# Patient Record
Sex: FEMALE | Race: WHITE | NOT HISPANIC OR LATINO
[De-identification: names, ages, dates, MRNs, and addresses within clinical notes are randomized per-mention and may not be internally consistent; named-entity substitution may affect disease eponyms.]

---

## 2018-07-10 PROBLEM — Z00.00 ENCOUNTER FOR PREVENTIVE HEALTH EXAMINATION: Status: ACTIVE | Noted: 2018-07-10

## 2018-08-27 ENCOUNTER — APPOINTMENT (OUTPATIENT)
Dept: ORTHOPEDIC SURGERY | Facility: CLINIC | Age: 23
End: 2018-08-27

## 2018-09-24 ENCOUNTER — APPOINTMENT (OUTPATIENT)
Dept: ORTHOPEDIC SURGERY | Facility: CLINIC | Age: 23
End: 2018-09-24
Payer: COMMERCIAL

## 2018-09-24 VITALS
BODY MASS INDEX: 23.54 KG/M2 | HEIGHT: 67 IN | DIASTOLIC BLOOD PRESSURE: 73 MMHG | HEART RATE: 76 BPM | TEMPERATURE: 98 F | SYSTOLIC BLOOD PRESSURE: 111 MMHG | WEIGHT: 150 LBS

## 2018-09-24 PROCEDURE — 99204 OFFICE O/P NEW MOD 45 MIN: CPT

## 2018-09-24 PROCEDURE — 73630 X-RAY EXAM OF FOOT: CPT | Mod: RT

## 2018-09-24 RX ORDER — NORGESTIMATE AND ETHINYL ESTRADIOL 7DAYSX3 LO
0.18/0.215/0.25 KIT ORAL
Refills: 0 | Status: ACTIVE | COMMUNITY

## 2019-02-15 ENCOUNTER — APPOINTMENT (OUTPATIENT)
Dept: ORTHOPEDIC SURGERY | Facility: CLINIC | Age: 24
End: 2019-02-15
Payer: COMMERCIAL

## 2019-02-15 PROCEDURE — 99214 OFFICE O/P EST MOD 30 MIN: CPT

## 2019-02-15 NOTE — HISTORY OF PRESENT ILLNESS
[FreeTextEntry1] : 23 year year old female presenting with right foot pain. The patient’s pain is noted to be a 5/10. The patient describes their pain and swelling as the same compared to their last visit. The patient reports that she is booked for surgery for March 13, 2019. The patient would like to go over details relating to the surgery and  PO care. She presents wearing regular shoes. No other complaints at this time.

## 2019-02-15 NOTE — DISCUSSION/SUMMARY
[de-identified] : Today I had a lengthy discussion with the patient regarding their right foot pain.I have addressed all the patient's concerns surrounding the pathology of their condition. I educated the patient about the surgical procedure. I also reviewed the details of the surgery including PO care, PO recovery time, and PO shoe-wear modifications. I answered all the patient's questions regarding the surgery and provided her with a better understanding of the surgical procedure. The patient understood and verbally agreed to the treatment plan. All of their questions were answered and they were satisfied with the visit. The patient should call the office if they have any questions or experience worsening symptoms.

## 2019-02-15 NOTE — PHYSICAL EXAM
[de-identified] : General: Alert and oriented x3. In no acute distress. Pleasant in nature with a normal affect. No apparent respiratory distress.\par \par R Foot Exam\par \par Skin: Clean, dry and intact.\par Inspection: No obvious malalignment, no swelling, no effusion. No lymphadenopathy. Abnormal cascade to toes, 2nd and 4th are shorter. The 1st, 3rd, and 5th are appropriate lengths. \par Pulses: 2+ DP/PT pulses\par ROM: Foot: Full ROM of digits. Ankle: 10 degrees dorsiflexion, 40 degrees of plantarflexion, 10 degrees of subtalar motion.\par Tenderness: plantar callus 1st and 3rd MTP joint. Recessed MTP 2nd and 4th. No tenderness over the lateral malleolus, no CFL/ATFL/PTFL pain. No medial malleolus pain, no deltoid ligament pain. No heel pain. No Achilles tenderness. No 5th metatarsal pain. No pain to the LisFranc joint. No ttp over the posterior tibial tendon. \par Painful ROM: None\par Stability: Negative anterior/posterior drawer. \par Strength: 5/5 ADD/ABD/TA/GS/EHL/FHL/EDL\par Neuro: Intact to light touch throughout\par Additional tests: Negative Reis's test, negative tarsal tunnel tinel's, negative single heel rise.

## 2019-02-15 NOTE — ADDENDUM
[FreeTextEntry1] : I, Jonathon Low, acted solely as a scribe for Dr. Arthur Bains on this date 02/15/2019  .\par  \par All medical record entries made by the Scribe were at my, Dr. Arthur Bains, direction and personally dictated by me on 02/15/2019 . I have reviewed the chart and agree that the record accurately reflects my personal performance of the history, physical exam, assessment and plan. I have also personally directed, reviewed, and agreed with the chart.\par \par \par

## 2019-03-08 RX ORDER — ALPRAZOLAM 0.25 MG/1
0.25 TABLET ORAL
Qty: 2 | Refills: 0 | Status: ACTIVE | COMMUNITY
Start: 2019-03-08 | End: 1900-01-01

## 2019-03-12 ENCOUNTER — OUTPATIENT (OUTPATIENT)
Dept: OUTPATIENT SERVICES | Facility: HOSPITAL | Age: 24
LOS: 1 days | Discharge: HOME | End: 2019-03-12
Payer: COMMERCIAL

## 2019-03-12 ENCOUNTER — APPOINTMENT (OUTPATIENT)
Dept: ORTHOPEDIC SURGERY | Facility: HOSPITAL | Age: 24
End: 2019-03-12

## 2019-03-12 VITALS
OXYGEN SATURATION: 100 % | RESPIRATION RATE: 16 BRPM | DIASTOLIC BLOOD PRESSURE: 84 MMHG | HEART RATE: 56 BPM | TEMPERATURE: 98 F | SYSTOLIC BLOOD PRESSURE: 137 MMHG

## 2019-03-12 VITALS
SYSTOLIC BLOOD PRESSURE: 133 MMHG | HEIGHT: 67 IN | HEART RATE: 53 BPM | RESPIRATION RATE: 16 BRPM | OXYGEN SATURATION: 99 % | WEIGHT: 149.91 LBS | TEMPERATURE: 98 F

## 2019-03-12 DIAGNOSIS — M79.671 PAIN IN RIGHT FOOT: ICD-10-CM

## 2019-03-12 DIAGNOSIS — M77.41 METATARSALGIA, RIGHT FOOT: ICD-10-CM

## 2019-03-12 LAB — HCG UR QL: NEGATIVE — SIGNIFICANT CHANGE UP

## 2019-03-12 PROCEDURE — 28308 INCISION OF METATARSAL: CPT | Mod: T7

## 2019-03-12 PROCEDURE — 76000 FLUOROSCOPY <1 HR PHYS/QHP: CPT | Mod: 26

## 2019-03-12 RX ORDER — NORGESTIMATE AND ETHINYL ESTRADIOL 7DAYSX3 LO
1 KIT ORAL
Qty: 0 | Refills: 0 | COMMUNITY

## 2019-03-12 RX ORDER — OXYCODONE HYDROCHLORIDE 5 MG/1
10 TABLET ORAL ONCE
Qty: 0 | Refills: 0 | Status: DISCONTINUED | OUTPATIENT
Start: 2019-03-12 | End: 2019-03-12

## 2019-03-12 RX ORDER — SODIUM CHLORIDE 9 MG/ML
1000 INJECTION, SOLUTION INTRAVENOUS
Qty: 0 | Refills: 0 | Status: DISCONTINUED | OUTPATIENT
Start: 2019-03-12 | End: 2019-03-12

## 2019-03-12 RX ORDER — ASPIRIN/CALCIUM CARB/MAGNESIUM 324 MG
1 TABLET ORAL
Qty: 30 | Refills: 0 | OUTPATIENT
Start: 2019-03-12 | End: 2019-04-10

## 2019-03-12 RX ORDER — ONDANSETRON 8 MG/1
1 TABLET, FILM COATED ORAL
Qty: 30 | Refills: 0 | OUTPATIENT
Start: 2019-03-12 | End: 2019-03-21

## 2019-03-12 RX ORDER — HYDROMORPHONE HYDROCHLORIDE 2 MG/ML
0.5 INJECTION INTRAMUSCULAR; INTRAVENOUS; SUBCUTANEOUS
Qty: 0 | Refills: 0 | Status: DISCONTINUED | OUTPATIENT
Start: 2019-03-12 | End: 2019-03-12

## 2019-03-12 RX ORDER — ONDANSETRON 8 MG/1
4 TABLET, FILM COATED ORAL ONCE
Qty: 0 | Refills: 0 | Status: DISCONTINUED | OUTPATIENT
Start: 2019-03-12 | End: 2019-03-12

## 2019-03-12 RX ORDER — DOCUSATE SODIUM 100 MG
1 CAPSULE ORAL
Qty: 45 | Refills: 0 | OUTPATIENT
Start: 2019-03-12 | End: 2019-03-26

## 2019-03-12 RX ADMIN — OXYCODONE HYDROCHLORIDE 10 MILLIGRAM(S): 5 TABLET ORAL at 12:00

## 2019-03-12 RX ADMIN — OXYCODONE HYDROCHLORIDE 10 MILLIGRAM(S): 5 TABLET ORAL at 12:15

## 2019-03-12 NOTE — ASU DISCHARGE PLAN (ADULT/PEDIATRIC) - CARE PROVIDER_API CALL
Arthur Bains (DO)  Orthopaedic Surgery  [address] [place] [address]  Phone: [phone]  Fax: [phone]  Follow Up Time:

## 2019-03-12 NOTE — ASU DISCHARGE PLAN (ADULT/PEDIATRIC) - ASU DC SPECIAL INSTRUCTIONSFT
please call office for follow up appointment; please rest and elevate affected foot; non weight bearing right lower extremity, crutches as needed; keep splint clean dry intact;

## 2019-03-12 NOTE — BRIEF OPERATIVE NOTE - NSICDXBRIEFPROCEDURE_GEN_ALL_CORE_FT
PROCEDURES:  Osteotomy, metatarsal, fifth 12-Mar-2019 11:55:21 Shortening wedge osteotomy of Right 3rd metatarsal Carrie Spangler

## 2019-03-14 DIAGNOSIS — M77.41 METATARSALGIA, RIGHT FOOT: ICD-10-CM

## 2019-03-20 ENCOUNTER — APPOINTMENT (OUTPATIENT)
Dept: ORTHOPEDIC SURGERY | Facility: CLINIC | Age: 24
End: 2019-03-20
Payer: COMMERCIAL

## 2019-03-20 PROCEDURE — 97760 ORTHOTIC MGMT&TRAING 1ST ENC: CPT | Mod: 58

## 2019-03-20 PROCEDURE — 73630 X-RAY EXAM OF FOOT: CPT | Mod: RT

## 2019-03-20 PROCEDURE — 99024 POSTOP FOLLOW-UP VISIT: CPT

## 2019-03-20 RX ORDER — ONDANSETRON 4 MG/1
4 TABLET ORAL
Qty: 30 | Refills: 0 | Status: ACTIVE | COMMUNITY
Start: 2019-03-12

## 2019-03-20 RX ORDER — OXYCODONE AND ACETAMINOPHEN 5; 325 MG/1; MG/1
5-325 TABLET ORAL
Qty: 42 | Refills: 0 | Status: ACTIVE | COMMUNITY
Start: 2019-03-12

## 2019-03-20 NOTE — HISTORY OF PRESENT ILLNESS
[___ Days Post Op] : post op day #[unfilled] [0] : no pain reported [Swelling] : swollen [Doing Well] : is doing well [No Sign of Infection] : is showing no signs of infection [Excellent Pain Control] : has excellent pain control [Chills] : no chills [Fever] : no fever [Nausea] : no nausea [Vomiting] : no vomiting [de-identified] : Third metatarsal shortening osteotomy, midshaft.\par DOS 3/12/19 [de-identified] : 23 year old female present in the office s/p Third metatarsal shortening osteotomy, midshaft.DOS 3/12/19. Pt states she has improved in pain and swelling as compared to their last visit.  Pt is accompanied by her mother. No other complaints at this time.  [de-identified] : General: Alert and oriented x3. In no acute distress. Pleasant in nature with a normal affect. No apparent respiratory distress. No lymphadenopathy. \par \par The wound is intact. Sutures in place. No evidence of any diastases or dehiscence. No surrounding erythema noted. No fluctuance. The area is warm and well-perfused. The patient is able to wiggle her toes. Neurovascularly intact.  [de-identified] : 3V of R foot were ordered obtained and reviewed by me today revealed s/p Third metatarsal shortening osteotomy, midshaft. Hardware in place  [de-identified] : 23 year old female present in the office s/p Third metatarsal shortening osteotomy, midshaft.\par DOS 3/12/19. I have fitted the patient with a CAM boot today which they are to treat like a cast. They are to be non weight bearing at this time until further notified. I have also provided the pt with a double toe budin splint. The pt is to call me as soon as possible if they notice any worsening pain or symptoms. I would like to follow up with the patient as next scheduled with repeat Xrays. All questions were answered and the patient verbalized understanding. The patient is in agreement with this treatment plan.

## 2019-03-20 NOTE — ADDENDUM
[FreeTextEntry1] : Documented by Brionna Riddle acting as a scribe for Dr. Bains on 03/20/2019 \par \par All medical record entries made by the Scribe were at my, Dr. English, direction and\par personally dictated by me on 03/20/2019 . I have reviewed the chart and agree that the record\par accurately reflects my personal performance of the history, physical exam, procedure and imaging.

## 2019-03-25 ENCOUNTER — APPOINTMENT (OUTPATIENT)
Dept: ORTHOPEDIC SURGERY | Facility: CLINIC | Age: 24
End: 2019-03-25
Payer: COMMERCIAL

## 2019-03-25 PROCEDURE — 99024 POSTOP FOLLOW-UP VISIT: CPT

## 2019-03-25 PROCEDURE — 73630 X-RAY EXAM OF FOOT: CPT | Mod: RT

## 2019-03-25 NOTE — ADDENDUM
[FreeTextEntry1] : I, Jonathon Low, acted solely as a scribe for Dr. Arthur Bains on this date 03/25/2019  .\par  \par All medical record entries made by the Scribe were at my, Dr. Arthur Bains, direction and personally dictated by me on 03/25/2019 . I have reviewed the chart and agree that the record accurately reflects my personal performance of the history, physical exam, assessment and plan. I have also personally directed, reviewed, and agreed with the chart.\par \par \par

## 2019-03-25 NOTE — HISTORY OF PRESENT ILLNESS
[___ Days Post Op] : post op day #[unfilled] [Clean/Dry/Intact] : clean, dry and intact [Healed] : healed [Neuro Intact] : an unremarkable neurological exam [Vascular Intact] : ~T peripheral vascular exam normal [Negative Marie's] : maneuvers demonstrated a negative Marie's sign [Doing Well] : is doing well [Excellent Pain Control] : has excellent pain control [No Sign of Infection] : is showing no signs of infection [Sutures Removed] : sutures were removed [Chills] : no chills [Fever] : no fever [Nausea] : no nausea [Vomiting] : no vomiting [Erythema] : not erythematous [Discharge] : absent of discharge [Swelling] : not swollen [Dehiscence] : not dehisced [de-identified] : Third metatarsal shortening osteotomy, midshaft.\par DOS 3/12/19. [de-identified] : 23 year year old female presenting  13 days post-op from third metatarsal shortening osteotomy, midshaft.  The patient’s pain is noted to be a 0/10. The patient describes their pain as 100% improved compared to their last visit. She describes her swelling as 50% improved compared to the last visit. She presents wearing a CAM boot. She presents ambulating with crutches. The patient reports that they are currently not attending physical therapy. \par \par She  is currently taking no pain medication. No other complaints at this time. \par   [de-identified] : General: Alert and oriented x3. In no acute distress. Pleasant in nature with a normal affect. No apparent respiratory distress.\par \par R Foot Exam  \par The wound is intact. no evidence of any diastases or dehiscence. No surrounding erythema noted. No fluctuance. The area is warm and well perfused. The patient is able to wiggle their toes. Neurovascularly intact.\par \par \par   [de-identified] : 3V of the R foot were ordered obtained and reviewed by me today, 03/25/2019 , revealed: No major change. Hardware in good position.\par \par \par \par  \par \par \par \par   [de-identified] : Today I had a lengthy discussion with the patient regarding their post-op care. I have addressed all the patient's concerns surrounding the pathology of their condition. The patient should continue to utilize their CAM boot. The patient should be non weight bearing at this time. The patient can heel weight bear after 4/1/19. They can fully weight bear after 4/8/19. I would like to see the patient back in the office in 2 weeks to reassess their condition. The patient understood and verbally agreed to the treatment plan. All of their questions were answered and they were satisfied with the visit. The patient should call the office if they have any questions or experience worsening symptoms. \par  Heel WB in boot in 1 week\par WBAT in boot in 2 weeks\par F/u in 2 weeks.

## 2019-04-01 ENCOUNTER — OTHER (OUTPATIENT)
Age: 24
End: 2019-04-01

## 2019-04-01 PROBLEM — M79.661 RIGHT CALF PAIN: Status: ACTIVE | Noted: 2019-04-01

## 2019-04-08 ENCOUNTER — APPOINTMENT (OUTPATIENT)
Dept: ORTHOPEDIC SURGERY | Facility: CLINIC | Age: 24
End: 2019-04-08
Payer: COMMERCIAL

## 2019-04-08 DIAGNOSIS — I82.499 ACUTE EMBOLISM AND THROMBOSIS OF OTHER SPECIFIED DEEP VEIN OF UNSPECIFIED LOWER EXTREMITY: ICD-10-CM

## 2019-04-08 DIAGNOSIS — M79.661 PAIN IN RIGHT LOWER LEG: ICD-10-CM

## 2019-04-08 PROCEDURE — 73630 X-RAY EXAM OF FOOT: CPT | Mod: RT

## 2019-04-08 PROCEDURE — 99024 POSTOP FOLLOW-UP VISIT: CPT

## 2019-04-08 NOTE — HISTORY OF PRESENT ILLNESS
[___ Weeks Post Op] : [unfilled] weeks post op [Clean/Dry/Intact] : clean, dry and intact [Healed] : healed [Neuro Intact] : an unremarkable neurological exam [Vascular Intact] : ~T peripheral vascular exam normal [Negative Marie's] : maneuvers demonstrated a negative Marie's sign [Doing Well] : is doing well [Excellent Pain Control] : has excellent pain control [No Sign of Infection] : is showing no signs of infection [Hardware in Good Position] : hardware in good position [Good Overall Alignment] : good overall alignment [Fixation Site Stable] : fixation site appears stable [Chills] : no chills [Fever] : no fever [Nausea] : no nausea [Vomiting] : no vomiting [Erythema] : not erythematous [Discharge] : absent of discharge [Swelling] : not swollen [Dehiscence] : not dehisced [de-identified] : Third metatarsal shortening osteotomy, midshaft.\par DOS 3/12/19. \par  [de-identified] : 23 year year old female presenting 4 weeks post-op from Third metatarsal shortening osteotomy, midshaft.  The patient’s pain is noted to be a 1/10. The patient describes their pain as 80% improved compared to their last visit. She describes her swelling as 50% improved compared to the last visit. The patient states that she currently has no pain. The patient was diagnosed last week with a DVT of the RLE. Since then she saw a thrombosis team in [place] and is now on Eliquis. She presents wearing a CAM boot. She  is currently taking no pain medication. The patient reports that they are currently not attending physical therapy. No other complaints at this time. \par   [de-identified] : General: Alert and oriented x3. In no acute distress. Pleasant in nature with a normal affect. No apparent respiratory distress.\par \par R Foot Exam\par The wound is intact. no evidence of any diastases or dehiscence. No surrounding erythema noted. No fluctuance. The area is warm and well perfused. The patient is able to wiggle their toes. Neurovascularly intact.\par \par \par   [de-identified] : 3V of the R foot were ordered obtained and reviewed by me today, 04/08/2019 , revealed: Hardware in good position. Healing noted. \par \par \par \par   [de-identified] : Today I had a lengthy discussion with the patient regarding their post-op care. I have addressed all the patient's concerns surrounding the pathology of their condition. I recommend that the patient continue to utilize her CAM boot. The patient can begin to slowly start to weight bear as tolerated in the CAM boot. In 1-2 weeks the patient can begin to transition into a hard soled shoe as tolerated. The patient was provided with the hard soled shoe in the office today. I recommend the patient undergo a course of physical therapy for the right foot  2-3 times a week for a total of 6-8 weeks. A prescription was given for the physical therapy today. The patient should continue to utilize Eliquis as instructed by the thrombosis team. I would like to see the patient back in the office in 6 weeks to reassess their condition.The patient understood and verbally agreed to the treatment plan. All of their questions were answered and they were satisfied with the visit. The patient should call the office if they have any questions or experience worsening symptoms. \par

## 2019-04-08 NOTE — ADDENDUM
[FreeTextEntry1] : I, Jonathon Low, acted solely as a scribe for Dr. Arthur Bains on this date 04/08/2019  .\par  \par All medical record entries made by the Scribe were at my, Dr. Arthur Bains, direction and personally dictated by me on 04/08/2019 . I have reviewed the chart and agree that the record accurately reflects my personal performance of the history, physical exam, assessment and plan. I have also personally directed, reviewed, and agreed with the chart.\par \par \par

## 2019-05-06 ENCOUNTER — APPOINTMENT (OUTPATIENT)
Dept: ORTHOPEDIC SURGERY | Facility: CLINIC | Age: 24
End: 2019-05-06
Payer: COMMERCIAL

## 2019-05-06 DIAGNOSIS — Q72.891 OTHER REDUCTION DEFECTS OF RIGHT LOWER LIMB: ICD-10-CM

## 2019-05-06 PROCEDURE — 99024 POSTOP FOLLOW-UP VISIT: CPT

## 2019-05-06 PROCEDURE — 73630 X-RAY EXAM OF FOOT: CPT | Mod: RT

## 2019-05-06 NOTE — ADDENDUM
[FreeTextEntry1] : I, Jonathon Low, acted solely as a scribe for Dr. Arthur Bains on this date 05/06/2019  .\par  \par All medical record entries made by the Scribe were at my, Dr. Arthur Bains, direction and personally dictated by me on 05/06/2019 . I have reviewed the chart and agree that the record accurately reflects my personal performance of the history, physical exam, assessment and plan. I have also personally directed, reviewed, and agreed with the chart.\par \par \par

## 2019-05-06 NOTE — HISTORY OF PRESENT ILLNESS
[___ Weeks Post Op] : [unfilled] weeks post op [Clean/Dry/Intact] : clean, dry and intact [Healed] : healed [Neuro Intact] : an unremarkable neurological exam [Vascular Intact] : ~T peripheral vascular exam normal [Negative Marie's] : maneuvers demonstrated a negative Marie's sign [Doing Well] : is doing well [Excellent Pain Control] : has excellent pain control [No Sign of Infection] : is showing no signs of infection [Swelling] : swollen [Hardware in Good Position] : hardware in good position [No Obvious Fractures] : no obvious fractures [Good Overall Alignment] : good overall alignment [Fixation Site Stable] : fixation site appears stable [Chills] : no chills [Fever] : no fever [Vomiting] : no vomiting [Nausea] : no nausea [Erythema] : not erythematous [Discharge] : absent of discharge [Dehiscence] : not dehisced [de-identified] : 8 weeks post op. Third metatarsal shortening osteotomy, midshaft.\par DOS 3/12/19. \par  [de-identified] : General: Alert and oriented x3. In no acute distress. Pleasant in nature with a normal affect. No apparent respiratory distress.\par L Foot Exam \par The wound is intact. no evidence of any diastases or dehiscence. No surrounding erythema noted. No fluctuance. The area is warm and well perfused. The patient is able to wiggle their toes. Neurovascularly intact. No calf pain. \par \par \par   [de-identified] : 23 year year old female presenting 8 weeks post-op from Third metatarsal shortening osteotomy, midshaft.  The patient’s pain is noted to be a 3/10. The patient describes their pain and swelling as 90% improved compared to the last visit. The patient c/o of mild ankle swelling. The patient also states that she has some discomfort while walking bare-foot. The patient states that she has been using regular sneakers. She  is currently taking no pain medication. She presents wearing regular shoes. The patient reports that they have been attending physical therapy. The patient states that she has been on blood thinning medications since March of 2019. No other complaints at this time. \par   [de-identified] : 3V of the L foot were ordered obtained and reviewed by me today, 05/06/2019 , revealed:\par Hardware in good position. Healing noted. \par \par \par \par   [de-identified] : Patient is a 23 year year old female present in the office today 8 weeks s/p Third metatarsal shortening osteotomy, midshaft. I advised the patient to avoid high impact activities at this time such as running. In 4-6 weeks the patient can ween back into jogging and eventually running as tolerated. I encouraged the patient to utilize the stationary cycle or elliptical at the gym. I encouraged the patient to continue to perform their PT exercises at home. The patient does not have to attend formal PT at this time. I would like to see the patient back in the office in 2-3 months to reassess their condition. The patient understood and verbally agreed to the treatment plan. All of their questions were answered and they were satisfied with the visit. The patient should call the office if they have any questions or experience worsening symptoms.\par

## 2020-12-26 ENCOUNTER — TRANSCRIPTION ENCOUNTER (OUTPATIENT)
Age: 25
End: 2020-12-26

## 2021-02-05 ENCOUNTER — TRANSCRIPTION ENCOUNTER (OUTPATIENT)
Age: 26
End: 2021-02-05

## 2021-03-02 NOTE — ASU DISCHARGE PLAN (ADULT/PEDIATRIC) - PATIENT EDUCATION MATERIALS PROVIED
[Dear  ___] : Dear ~PATY, [Consult Letter:] : I had the pleasure of evaluating your patient, [unfilled]. [( Thank you for referring [unfilled] for consultation for _____ )] : Thank you for referring [unfilled] for consultation for [unfilled] [Please see my note below.] : Please see my note below. [Consult Closing:] : Thank you very much for allowing me to participate in the care of this patient.  If you have any questions, please do not hesitate to contact me. [Sincerely,] : Sincerely, [FreeTextEntry2] : Conchita CHILD\par [facility]\par [address]\[place] [address] [FreeTextEntry3] : Eileen Mason DO\par  of Medicine\par [facility] [employer] [facility]\par  Other (specify)/home care instruction sheet/Provider pre-printed instructions given

## 2021-07-29 ENCOUNTER — TRANSCRIPTION ENCOUNTER (OUTPATIENT)
Age: 26
End: 2021-07-29

## 2021-11-04 ENCOUNTER — TRANSCRIPTION ENCOUNTER (OUTPATIENT)
Age: 26
End: 2021-11-04

## 2021-12-17 ENCOUNTER — TRANSCRIPTION ENCOUNTER (OUTPATIENT)
Age: 26
End: 2021-12-17

## 2024-04-17 ENCOUNTER — EMERGENCY (EMERGENCY)
Facility: HOSPITAL | Age: 29
LOS: 1 days | Discharge: HOME | End: 2024-04-17
Admitting: EMERGENCY MEDICINE
Payer: COMMERCIAL

## 2024-04-17 VITALS
RESPIRATION RATE: 16 BRPM | HEART RATE: 61 BPM | DIASTOLIC BLOOD PRESSURE: 73 MMHG | TEMPERATURE: 98 F | OXYGEN SATURATION: 98 % | SYSTOLIC BLOOD PRESSURE: 108 MMHG

## 2024-04-17 VITALS
SYSTOLIC BLOOD PRESSURE: 114 MMHG | TEMPERATURE: 98 F | DIASTOLIC BLOOD PRESSURE: 74 MMHG | HEART RATE: 66 BPM | WEIGHT: 141.1 LBS | HEIGHT: 67 IN | OXYGEN SATURATION: 100 % | RESPIRATION RATE: 15 BRPM

## 2024-04-17 PROCEDURE — 99284 EMERGENCY DEPT VISIT MOD MDM: CPT

## 2024-04-17 PROCEDURE — 93970 EXTREMITY STUDY: CPT | Mod: 26

## 2024-04-17 NOTE — ED PROVIDER NOTE - OBJECTIVE STATEMENT
Patient is a 28-year-old female presenting today complaining of lower leg pain, bilaterally left greater than right.  She also states that she had some numbness to her left lateral calf yesterday which has since resolved.  She states that she is currently taking several medications as part of her egg freezing process and has had a DVT in the past for which she took anticoagulation.  This was 4 years ago and was precipitated by a surgery as well as being on birth control.  She denies any recent travel, birth control, extremity swelling, smoking, history of cancer.  She denies any rash, fever, chills, new shoes, new exercises.

## 2024-04-17 NOTE — ED PROVIDER NOTE - NSFOLLOWUPINSTRUCTIONS_ED_ALL_ED_FT
FOLLOW UP WITH YOUR DOCTOR AS NEEDED FOR ANY  NEW OR CONCERNING SYMPTOMS.     RETURN TO ER FOR REDNESS, SWELLING, INCREASED PAIN, RECURRENT NUMBNESS, WEAKNESS, ANY OTHER CONCERNS.

## 2024-04-17 NOTE — ED PROVIDER NOTE - PATIENT PORTAL LINK FT
You can access the FollowMyHealth Patient Portal offered by [facility] by registering at the following website: [email or web address]. By joining [facility]’s FollowMyHealth portal, you will also be able to view your health information using other applications (apps) compatible with our system.

## 2024-04-17 NOTE — ED ADULT TRIAGE NOTE - CHIEF COMPLAINT QUOTE
patient walk in concerned for DVT; doing egg freezing and noticed left calf 'was numb yesterday" and also having on and off vertigo; hx DVT 4 years ago and was on eliquis; no use of blood thinners at this time

## 2024-04-17 NOTE — ED ADULT NURSE NOTE - OBJECTIVE STATEMENT
Pt presents to ED A&Ox4 with c/o left lower leg numbness. Pt reports going for egg freezing procedure earlier today and experiencing numbness to leg afterwards. Pt reports taking hormone therapy in conjunction with egg freezing. Denies trauma to area. Upon inspection, no redness or edema noted. Posterior tibial pulses present bilaterally. Denies recent travel, surgery, oral contraceptive use.

## 2024-04-17 NOTE — ED PROVIDER NOTE - IV ALTEPLASE EXCL REL HIDDEN
Medications pended for 90 day crista   
Reason for Call:  Other prescription    Detailed comments: Stanford came into the  and scheduled his annual wellness visit with Dr. Lawrence on March 1st at 3:00pm. Stanford wants to make sure he can have enough refills to get him to his appt with .     Phone Number Patient can be reached at: Cell number on file:    Telephone Information:   Mobile [phone]       Best Time: asap    Can we leave a detailed message on this number? YES    Call taken on 1/5/2023 at 1:34 PM by Jyoti Ellis          
show

## 2024-04-17 NOTE — ED PROVIDER NOTE - CLINICAL SUMMARY MEDICAL DECISION MAKING FREE TEXT BOX
Patient presents today with complaint of bilateral lower extremity discomfort with some resolved numbness to her left lateral calf.  She has a history of DVT 4 years ago prompted by surgery and birth control.  She is no longer on birth control, but she is undergoing fertility treatments.  Her symptoms started yesterday so she wanted to come and have ultrasounds to rule out DVT given her history.  Will obtain ultrasound and reassess the patient.

## 2024-04-17 NOTE — ED PROVIDER NOTE - PHYSICAL EXAMINATION
Constitutional: Well appearing, awake, alert, oriented to person, place, time/situation and in no apparent distress.  HEENT: Airway patent, NCAT  Resp: no conversational dyspnea, tachypnea, or signs of respiratory distress  Msk: ambulating with normal steady gait, no edema to bilat lower extremities, 2+ dp/pt pulses equal bilat, no redness, warmth, or palpable cords, no bruising or rash. no tenderness. sensation currently intact throughout bilat lower extremities. compartments soft.   Neuro: A&Ox3

## 2024-04-19 DIAGNOSIS — M79.661 PAIN IN RIGHT LOWER LEG: ICD-10-CM

## 2024-04-19 DIAGNOSIS — Z86.718 PERSONAL HISTORY OF OTHER VENOUS THROMBOSIS AND EMBOLISM: ICD-10-CM

## 2024-04-19 DIAGNOSIS — M79.662 PAIN IN LEFT LOWER LEG: ICD-10-CM
